# Patient Record
Sex: FEMALE | Race: OTHER | NOT HISPANIC OR LATINO
[De-identification: names, ages, dates, MRNs, and addresses within clinical notes are randomized per-mention and may not be internally consistent; named-entity substitution may affect disease eponyms.]

---

## 2018-07-11 PROBLEM — Z00.00 ENCOUNTER FOR PREVENTIVE HEALTH EXAMINATION: Status: ACTIVE | Noted: 2018-07-11

## 2018-07-13 ENCOUNTER — APPOINTMENT (OUTPATIENT)
Dept: HUMAN REPRODUCTION | Facility: CLINIC | Age: 33
End: 2018-07-13
Payer: COMMERCIAL

## 2018-07-13 PROCEDURE — 36415 COLL VENOUS BLD VENIPUNCTURE: CPT

## 2018-07-13 PROCEDURE — 99205 OFFICE O/P NEW HI 60 MIN: CPT | Mod: 25

## 2018-07-13 PROCEDURE — 76830 TRANSVAGINAL US NON-OB: CPT

## 2018-07-27 ENCOUNTER — APPOINTMENT (OUTPATIENT)
Dept: HUMAN REPRODUCTION | Facility: CLINIC | Age: 33
End: 2018-07-27
Payer: COMMERCIAL

## 2018-07-27 PROCEDURE — 99215 OFFICE O/P EST HI 40 MIN: CPT

## 2024-03-25 ENCOUNTER — EMERGENCY (EMERGENCY)
Facility: HOSPITAL | Age: 39
LOS: 1 days | Discharge: HOME | End: 2024-03-25
Admitting: EMERGENCY MEDICINE
Payer: COMMERCIAL

## 2024-03-25 VITALS
HEIGHT: 68 IN | WEIGHT: 147.05 LBS | SYSTOLIC BLOOD PRESSURE: 95 MMHG | HEART RATE: 91 BPM | TEMPERATURE: 97 F | RESPIRATION RATE: 18 BRPM | OXYGEN SATURATION: 100 % | DIASTOLIC BLOOD PRESSURE: 61 MMHG

## 2024-03-25 PROCEDURE — 99284 EMERGENCY DEPT VISIT MOD MDM: CPT

## 2024-03-25 RX ORDER — CETIRIZINE HYDROCHLORIDE 10 MG/1
1 TABLET ORAL
Qty: 4 | Refills: 0
Start: 2024-03-25 | End: 2024-03-28

## 2024-03-25 RX ORDER — EPINEPHRINE 0.3 MG/.3ML
0.3 INJECTION INTRAMUSCULAR; SUBCUTANEOUS
Qty: 1 | Refills: 0
Start: 2024-03-25

## 2024-03-25 RX ORDER — FAMOTIDINE 10 MG/ML
20 INJECTION INTRAVENOUS ONCE
Refills: 0 | Status: COMPLETED | OUTPATIENT
Start: 2024-03-25 | End: 2024-03-25

## 2024-03-25 RX ORDER — DEXAMETHASONE 0.5 MG/5ML
10 ELIXIR ORAL ONCE
Refills: 0 | Status: COMPLETED | OUTPATIENT
Start: 2024-03-25 | End: 2024-03-25

## 2024-03-25 RX ORDER — DIPHENHYDRAMINE HCL 50 MG
1 CAPSULE ORAL
Qty: 16 | Refills: 0
Start: 2024-03-25 | End: 2024-03-28

## 2024-03-25 RX ORDER — DIPHENHYDRAMINE HCL 50 MG
50 CAPSULE ORAL ONCE
Refills: 0 | Status: COMPLETED | OUTPATIENT
Start: 2024-03-25 | End: 2024-03-25

## 2024-03-25 RX ADMIN — Medication 102 MILLIGRAM(S): at 07:58

## 2024-03-25 RX ADMIN — FAMOTIDINE 20 MILLIGRAM(S): 10 INJECTION INTRAVENOUS at 07:58

## 2024-03-25 RX ADMIN — Medication 50 MILLIGRAM(S): at 07:59

## 2024-03-25 NOTE — ED ADULT TRIAGE NOTE - CHIEF COMPLAINT QUOTE
walk in pt with c/o allergic reaction to unk substance. P/s began with eye swelling and hives last night and has progressed to slight lip swelling. No difficulty breathing at this time.

## 2024-03-25 NOTE — ED PROVIDER NOTE - CARE PROVIDER_API CALL
Chucky Eric  Allergy and Immunology  [address] [place] [address], [address] [place] [address]  Phone: [phone]  Fax: [phone]  Follow Up Time:

## 2024-03-25 NOTE — ED PROVIDER NOTE - NSFOLLOWUPINSTRUCTIONS_ED_ALL_ED_FT
Allergic Reaction    An allergic reaction is an abnormal reaction to a substance (allergen) by the body's defense system. Common allergens include medicines, food, insect bites or stings, and blood products. The body releases certain proteins into the blood that can cause a variety of symptoms such as an itchy rash, wheezing, swelling of the face/lips/tongue/throat, abdominal pain, nausea or vomiting. An allergic reaction is usually treated with medication. If your health care provider prescribed you an epinephrine injection device, make sure to keep it with you at all times.    Please follow up with allergist    SEEK IMMEDIATE MEDICAL CARE IF YOU HAVE ANY OF THE FOLLOWING SYMPTOMS: allergic reaction severe enough that required you to use epinephrine, tightness in your chest, swelling around your lips/tongue/throat, abdominal pain, vomiting or diarrhea, or lightheadedness/dizziness. These symptoms may represent a serious problem that is an emergency. Do not wait to see if the symptoms will go away. Use your auto-injector pen or anaphylaxis kit as you have been instructed. Call 911 and do not drive yourself to the hospital.

## 2024-03-25 NOTE — ED PROVIDER NOTE - RAPID ASSESSMENT
Pt is a 37yo F with a h/o exercise induced asthma, eczema, and p/w rash since yesterday afternoon.  Pt reports developing rash to legs around 4pm yesterday after walking in [place].  No new exposures to her knowledge.  Took Benadryl last night with no improvement.  This morning noted rash spread to entire body and she developed periorbital edema.  Denies any throat closure, SOB, or wheezing.    PE - diffuse urticaria.   Will initiate treatement with IV Benadryl, Pepcid, and Decadron.    Pt will be fully assessed by day team.

## 2024-03-25 NOTE — ED PROVIDER NOTE - PATIENT PORTAL LINK FT
You can access the FollowMyHealth Patient Portal offered by [facility] by registering at the following website: [email or web address]. By joining [facility]’s FollowMyHealth portal, you will also be able to view your health information using other applications (apps) compatible with our system.

## 2024-03-25 NOTE — ED PROVIDER NOTE - OBJECTIVE STATEMENT
37 yo female with hx asthma, eczema c/o itchy rash since yesterday. states she has been taking augmentin for the past few days for UTI, otherwise no new exposures. woke up this morning with periorbital edema and diffuse body rash. denies cp/sob/throat swelling, tongue swelling or lip swelling. no voice changes. no fever/chills or URI symptoms.

## 2024-03-25 NOTE — ED ADULT NURSE NOTE - OBJECTIVE STATEMENT
patient aox3, breathing even and unlabored on RA c.o allergic reaction. patient states she did not consume or did anything out of her normal routine. pt denies any known allergies. pt states she woke up and notice her face swollen, and patches or hives throughout her body. denied throat swelling or difficulty breathing.

## 2024-03-25 NOTE — ED PROVIDER NOTE - PHYSICAL EXAMINATION
CONSTITUTIONAL: Well-appearing; well-nourished; in no apparent distress.   	HEAD: Normocephalic; atraumatic. mild periorbital edema.    	EYES:  conjunctiva and sclera clear  	ENT: normal nose; no rhinorrhea; normal pharynx with no erythema or lesions. no tongue or lip swelling. no pharyngeal swelling. normal phonation.   	NECK: Supple; non-tender;   	CARDIOVASCULAR: Normal S1, S2; no murmurs, rubs, or gallops. Regular rate and rhythm.   	RESPIRATORY: Breathing easily; breath sounds clear and equal bilaterally; no wheezes, rhonchi, or rales.  	GI: Soft; non-distended; non-tender  	EXT: DSOUZA x 4. normal gait.   	SKIN: diffuse disseminated urticaria.   	NEURO: A & O x 3; face symmetric; grossly unremarkable.   PSYCHOLOGICAL: The patient’s mood and manner are appropriate.

## 2024-03-25 NOTE — ED PROVIDER NOTE - CLINICAL SUMMARY MEDICAL DECISION MAKING FREE TEXT BOX
39 yo female with hx asthma, eczema c/o itchy rash since yesterday. states she has been taking augmentin for the past few days for UTI, otherwise no new exposures. woke up this morning with periorbital edema and diffuse body rash. denies cp/sob/throat swelling, tongue swelling or lip swelling. no voice changes. no fever/chills or URI symptoms. looks well, vss, no hypoxia, no airway compromise, benadryl, decadron, pepcid ordered.    //no progression while in ED, stable, no airway compromise, advised patient to discontinue augmentin which she was taking for uti (was resistant to macrobid), she will call her pmd to get an alternate prescription for UTI based on urine culture results, will rx prednisone, antihistamines, epi pen prn, f/u allergist, return precautions discussed.

## 2024-03-28 DIAGNOSIS — T78.40XA ALLERGY, UNSPECIFIED, INITIAL ENCOUNTER: ICD-10-CM
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
## 2024-03-28 DIAGNOSIS — R21 RASH AND OTHER NONSPECIFIC SKIN ERUPTION: ICD-10-CM
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
## 2024-03-28 DIAGNOSIS — J45.909 UNSPECIFIED ASTHMA, UNCOMPLICATED: ICD-10-CM
